# Patient Record
(demographics unavailable — no encounter records)

---

## 2024-12-06 NOTE — PHYSICAL EXAM
[Chaperone Present] : A chaperone was present in the examining room during all aspects of the physical examination [Labia Majora] : were normal [Labia Minora] : were normal [Normal Appearance] : general appearance was normal [Atrophy] : atrophy [Rectocele] : a rectocele [Cystocele] : a cystocele [Uterine Prolapse] : uterine prolapse [No Bleeding] : there was no active vaginal bleeding [Normal] : no abnormalities [Exam Deferred] : was deferred [59294] : A chaperone was present during the pelvic exam. [FreeTextEntry2] : Radha [FreeTextEntry1] : General: Well, appearing. Alert and orientated. No acute distress HEENT: Normocephalic, atraumatic and extraocular muscles appear to be intact  Neck: Full range of motion, no obvious lymphadenopathy, deformities, or masses noted  Respiratory: Speaking in full sentences comfortably, normal work of breathing and no cough during visit Musculoskeletal: full range of motion Extremities: No upper extremity edema noted Skin: no obvious rash or skin lesions Neuro: Orientated X 3, speech is fluent, normal rate Psych: Normal mood and affect [Tenderness] : ~T no ~M abdominal tenderness observed [Distended] : not distended [de-identified] : 1-2mm abrasion on mons near right labia majora

## 2024-12-06 NOTE — HISTORY OF PRESENT ILLNESS
[Vaginal Wall Prolapse] : no [Unable To Restrain Bowel Movement] : no [x1] : nocturia once nightly [Urinary Frequency] : no [Feelings Of Urinary Urgency] : no [Pain During Urination (Dysuria)] : no [Urinary Tract Infection] : no [Constipation Obstructed Defecation] : no [Stool Visible Blood] : no [Incomplete Emptying Of Stool] : no [Pelvic Pain] : no [Vaginal Pain] : no [Rectal Pain] : no [de-identified] : none with pessary [FreeTextEntry5] : none with pessary [de-identified] : 2-3x/d [de-identified] : none [FreeTextEntry1] : Gila is a 71yo with Stage 2 uterovaginal prolapse, cystocele, rectocele currently managed with a RS #4 pessary. She was seen on 9/6/24. Denies vaginal bleeding, discharge, prolapse past the pessary, difficulty with urinating/defecating. She is happy with her pessary but is afraid to perform self-care, despite learning how to remove and insert the pessary. She was considering surgical correction at her last visit but would like to continue with the pessary instead. Her vulvar pruritis has resolved.   PMH: HLD, DM (reportedly well controlled) PSH: Cholecystectomy  Soc: Never smoker All: seafood (hives, itching)  Daily fluid intake: 1 bottle water + 1/2c coffee.

## 2024-12-06 NOTE — REASON FOR VISIT
[Follow-Up Visit_____] : a follow-up visit for [unfilled] [Family Member] : family member [[employer] ] : provided by [employer]   [Interpreters_IDNumber] : [id number] [Interpreters_FullName] : Bailee [TWNoteComboBox1] : Kyrgyz

## 2024-12-06 NOTE — DISCUSSION/SUMMARY
[FreeTextEntry1] : Gila is a 69yo with Stage 2 prolapse, managed with a RS #4 pessary. We reviewed pessary precautions and instructions. We discussed she can continue using the pessary for as long as she likes if she continues to get regular pessary maintenance since she does not want to perform self-care.    RTO 3m for pessary check

## 2024-12-06 NOTE — PROCEDURE
[Straight Catheterization] : insertion of a straight catheter [Urinary Tract Infection] : a urinary tract infection [___ Fr Straight Tip] : a [unfilled] in [place] straight tip catheter [Clear] : clear [Culture] : culture [Good Fit] : fits well [Pessary Inserted] : inserted [None] : no bleeding [Refit] : refit is not needed [Erosion] : no evidence of erosion [Erythema] : no erythema [Discharge] : no vaginal discharge [Infection] : no evidence of infection [FreeTextEntry1] : RS #4 [FreeTextEntry8] : [facility] reviewed

## 2025-03-07 NOTE — REASON FOR VISIT
[Follow-Up Visit_____] : a follow-up visit for [unfilled] [[employer] ] : provided by [employer]   [Interpreters_IDNumber] : [id number] [Interpreters_FullName] : Charley [TWNoteComboBox1] : Libyan

## 2025-03-07 NOTE — PHYSICAL EXAM
[Chaperone Present] : A chaperone was present in the examining room during all aspects of the physical examination [Labia Majora] : were normal [Labia Minora] : were normal [Normal Appearance] : general appearance was normal [Atrophy] : atrophy [Rectocele] : a rectocele [Cystocele] : a cystocele [Uterine Prolapse] : uterine prolapse [No Bleeding] : there was no active vaginal bleeding [Normal] : no abnormalities [Exam Deferred] : was deferred [FreeTextEntry2] : Kavita Rivera  [FreeTextEntry1] : General: Well, appearing. Alert and orientated. No acute distress HEENT: Normocephalic, atraumatic and extraocular muscles appear to be intact  Neck: Full range of motion, no obvious lymphadenopathy, deformities, or masses noted  Respiratory: Speaking in full sentences comfortably, normal work of breathing and no cough during visit Musculoskeletal: full range of motion Extremities: No upper extremity edema noted Skin: no obvious rash or skin lesions Neuro: Orientated X 3, speech is fluent, normal rate Psych: Normal mood and affect [Tenderness] : ~T no ~M abdominal tenderness observed [Distended] : not distended

## 2025-03-07 NOTE — DISCUSSION/SUMMARY
[FreeTextEntry1] : Gila is a 70yo with Stage 2 prolapse, managed with a RS #4 pessary. We reviewed pessary precautions and instructions. We discussed her vaginal pruritis and dryness is likely from atrophy. In postmenopausal women, we discussed the decreased level of estrogen in the vaginal tissues can result in atrophy and cause symptoms of dryness, irritation, burning, and urinary frequency/urgency. Risk and benefits of vaginal estrogen were reviewed including its local action, minimal systemic absorption, and lack of association with any breast or GYN malignancies. eRx for vaginal estrogen sent to her pharmacy.    RTO [employer] for pessary check

## 2025-03-07 NOTE — HISTORY OF PRESENT ILLNESS
[Vaginal Wall Prolapse] : no [Unable To Restrain Bowel Movement] : no [x1] : nocturia once nightly [Urinary Frequency] : no [Feelings Of Urinary Urgency] : no [Pain During Urination (Dysuria)] : no [Urinary Tract Infection] : no [Constipation Obstructed Defecation] : no [Stool Visible Blood] : no [Incomplete Emptying Of Stool] : no [Pelvic Pain] : no [Vaginal Pain] : no [Rectal Pain] : no [de-identified] : none with pessary [FreeTextEntry5] : none with pessary [de-identified] : 2-3x/d [de-identified] : none [FreeTextEntry1] : Gila is a 72yo with Stage 2 uterovaginal prolapse, cystocele, rectocele currently managed with a RS #4 pessary. She was seen on 12/6/24. Denies vaginal bleeding, discharge, prolapse past the pessary, difficulty with urinating/defecating. She is happy with her pessary but is afraid to perform self-care, despite learning how to remove and insert the pessary. Her only complaint today is some intermittent vaginal itching and dryness.   PMH: HLD, DM (reportedly well controlled) PSH: Cholecystectomy  Soc: Never smoker All: seafood (hives, itching)  Daily fluid intake: 1 bottle water + 1/2c coffee.

## 2025-04-30 NOTE — PHYSICAL EXAM
[Labia Majora] : were normal [Labia Minora] : were normal [Normal Appearance] : general appearance was normal [Atrophy] : atrophy [Rectocele] : a rectocele [Cystocele] : a cystocele [Uterine Prolapse] : uterine prolapse [No Bleeding] : there was no active vaginal bleeding [Normal] : no abnormalities [Exam Deferred] : was deferred [MA] : MA [FreeTextEntry2] : Radha [FreeTextEntry1] : General: Well, appearing. Alert and orientated. No acute distress HEENT: Normocephalic, atraumatic and extraocular muscles appear to be intact  Neck: Full range of motion, no obvious lymphadenopathy, deformities, or masses noted  Respiratory: Speaking in full sentences comfortably, normal work of breathing and no cough during visit Musculoskeletal: full range of motion Extremities: No upper extremity edema noted Skin: no obvious rash or skin lesions Neuro: Orientated X 3, speech is fluent, normal rate Psych: Normal mood and affect [Tenderness] : ~T no ~M abdominal tenderness observed [Distended] : not distended

## 2025-04-30 NOTE — DISCUSSION/SUMMARY
[FreeTextEntry1] : Gila is a 72yo with Stage 2 prolapse, managed with a RS #4 pessary. We reviewed pessary precautions and instructions. Regarding her urinary symptoms, cath urine specimen was collected and sent for urine culture to rule out infection.   We discussed her vulvar pruritis and vaginal discharge. Vaginitis swab sent. We discussed using nonhormonal moisturizers for her itching. She will also continue with the vaginal estrogen twice a week.    RTO  for pessary check

## 2025-04-30 NOTE — HISTORY OF PRESENT ILLNESS
[Vaginal Wall Prolapse] : no [Unable To Restrain Bowel Movement] : no [Urinary Frequency] : no [Feelings Of Urinary Urgency] : no [Constipation Obstructed Defecation] : no [Stool Visible Blood] : no [Incomplete Emptying Of Stool] : no [Pelvic Pain] : no [Vaginal Pain] : no [Rectal Pain] : no [x2] : nocturia two times a night [] : months ago [de-identified] : none with pessary [FreeTextEntry5] : none with pessary [de-identified] : white-yellow discharge  [de-identified] : 2-3x/d [de-identified] : sometimes  [de-identified] : sometimes  [de-identified] : daily [FreeTextEntry1] : Gila is a 70yo with Stage 2 uterovaginal prolapse, cystocele, rectocele currently managed with a RS #4 pessary. She was seen on 3/7/25 for a pessary check. Denies vaginal bleeding, prolapse past the pessary, difficulty with urinating/defecating. She presents today because she had a yeast infection that was treated by her gynecologist; however, she has dysuria, vaginal discharge, and vulvar itching now. She also reports more urgency and straining to void. She is using vaginal estrogen twice a week without bleeding.   PMH: HLD, DM (reportedly well controlled) PSH: Cholecystectomy  Soc: Never smoker All: seafood (hives, itching)  Daily fluid intake: 1 bottle water + 1/2c coffee.

## 2025-04-30 NOTE — PROCEDURE
[Good Fit] : fits well [Pessary Inserted] : inserted [Straight Catheterization] : insertion of a straight catheter [Urinary Tract Infection] : a urinary tract infection [___ Fr Straight Tip] : a [unfilled] in [place] straight tip catheter [None] : there were no complications with the catheter insertion [Clear] : clear [Culture] : culture [Refit] : refit is not needed [Erosion] : no evidence of erosion [Erythema] : no erythema [Discharge] : no vaginal discharge [Infection] : no evidence of infection [FreeTextEntry1] : RS #4 [FreeTextEntry8] : [facility]icare reviewed

## 2025-04-30 NOTE — REASON FOR VISIT
[Follow-Up Visit_____] : a follow-up visit for [unfilled] [Family Member] : family member [Declined  Services] : Patient was offered free  services in ~his/her~ preferred language and declined services. Patient insisted on family member providing interpretation   [TWNoteComboBox1] : Cymro